# Patient Record
Sex: FEMALE | Race: WHITE | Employment: OTHER | URBAN - METROPOLITAN AREA
[De-identification: names, ages, dates, MRNs, and addresses within clinical notes are randomized per-mention and may not be internally consistent; named-entity substitution may affect disease eponyms.]

---

## 2021-03-10 ENCOUNTER — HOSPITAL ENCOUNTER (OUTPATIENT)
Dept: PREADMISSION TESTING | Age: 81
Discharge: HOME | End: 2021-03-10
Payer: MEDICARE

## 2021-03-10 PROCEDURE — U0003 INFECTIOUS AGENT DETECTION BY NUCLEIC ACID (DNA OR RNA); SEVERE ACUTE RESPIRATORY SYNDROME CORONAVIRUS 2 (SARS-COV-2) (CORONAVIRUS DISEASE [COVID-19]), AMPLIFIED PROBE TECHNIQUE, MAKING USE OF HIGH THROUGHPUT TECHNOLOGIES AS DESCRIBED BY CMS-2020-01-R: HCPCS

## 2021-03-11 LAB — SARS-COV-2, NAA: NOT DETECTED

## 2021-03-11 RX ORDER — DEXTROMETHORPHAN/PSEUDOEPHED 2.5-7.5/.8
1.2 DROPS ORAL
Status: CANCELLED | OUTPATIENT
Start: 2021-03-11

## 2021-03-11 RX ORDER — SODIUM CHLORIDE 0.9 % (FLUSH) 0.9 %
5-40 SYRINGE (ML) INJECTION AS NEEDED
Status: CANCELLED | OUTPATIENT
Start: 2021-03-11

## 2021-03-11 RX ORDER — EPINEPHRINE 0.1 MG/ML
1 INJECTION INTRACARDIAC; INTRAVENOUS
Status: CANCELLED | OUTPATIENT
Start: 2021-03-11 | End: 2021-03-12

## 2021-03-11 RX ORDER — ATROPINE SULFATE 0.1 MG/ML
0.5 INJECTION INTRAVENOUS
Status: CANCELLED | OUTPATIENT
Start: 2021-03-11 | End: 2021-03-12

## 2021-03-11 RX ORDER — DIPHENHYDRAMINE HYDROCHLORIDE 50 MG/ML
50 INJECTION, SOLUTION INTRAMUSCULAR; INTRAVENOUS ONCE
Status: CANCELLED | OUTPATIENT
Start: 2021-03-11 | End: 2021-03-11

## 2021-03-11 RX ORDER — SODIUM CHLORIDE 0.9 % (FLUSH) 0.9 %
5-40 SYRINGE (ML) INJECTION EVERY 8 HOURS
Status: CANCELLED | OUTPATIENT
Start: 2021-03-11

## 2021-03-15 ENCOUNTER — HOSPITAL ENCOUNTER (OUTPATIENT)
Age: 81
Setting detail: OUTPATIENT SURGERY
Discharge: HOME | End: 2021-03-15
Attending: INTERNAL MEDICINE | Admitting: INTERNAL MEDICINE
Payer: MEDICARE

## 2021-03-15 VITALS
HEART RATE: 65 BPM | SYSTOLIC BLOOD PRESSURE: 132 MMHG | DIASTOLIC BLOOD PRESSURE: 71 MMHG | HEIGHT: 63 IN | WEIGHT: 155 LBS | OXYGEN SATURATION: 98 % | BODY MASS INDEX: 27.46 KG/M2 | TEMPERATURE: 97.3 F | RESPIRATION RATE: 18 BRPM

## 2021-03-15 LAB — GLUCOSE BLD STRIP.AUTO-MCNC: 140 MG/DL (ref 70–110)

## 2021-03-15 PROCEDURE — 76040000007: Performed by: INTERNAL MEDICINE

## 2021-03-15 PROCEDURE — 82962 GLUCOSE BLOOD TEST: CPT

## 2021-03-15 PROCEDURE — 88305 TISSUE EXAM BY PATHOLOGIST: CPT

## 2021-03-15 PROCEDURE — 77030039961 HC KT CUST COLON BSC -D: Performed by: INTERNAL MEDICINE

## 2021-03-15 PROCEDURE — 77030013991 HC SNR POLYP ENDOSC BSC -A: Performed by: INTERNAL MEDICINE

## 2021-03-15 PROCEDURE — 77030040361 HC SLV COMPR DVT MDII -B: Performed by: INTERNAL MEDICINE

## 2021-03-15 PROCEDURE — G0500 MOD SEDAT ENDO SERVICE >5YRS: HCPCS | Performed by: INTERNAL MEDICINE

## 2021-03-15 PROCEDURE — 2709999900 HC NON-CHARGEABLE SUPPLY: Performed by: INTERNAL MEDICINE

## 2021-03-15 PROCEDURE — 74011250636 HC RX REV CODE- 250/636: Performed by: INTERNAL MEDICINE

## 2021-03-15 PROCEDURE — 99153 MOD SED SAME PHYS/QHP EA: CPT | Performed by: INTERNAL MEDICINE

## 2021-03-15 RX ORDER — NALOXONE HYDROCHLORIDE 0.4 MG/ML
0.4 INJECTION, SOLUTION INTRAMUSCULAR; INTRAVENOUS; SUBCUTANEOUS
Status: DISCONTINUED | OUTPATIENT
Start: 2021-03-15 | End: 2021-03-15 | Stop reason: HOSPADM

## 2021-03-15 RX ORDER — MIDAZOLAM HYDROCHLORIDE 1 MG/ML
.25-5 INJECTION, SOLUTION INTRAMUSCULAR; INTRAVENOUS
Status: DISCONTINUED | OUTPATIENT
Start: 2021-03-15 | End: 2021-03-15 | Stop reason: HOSPADM

## 2021-03-15 RX ORDER — LEVOTHYROXINE SODIUM 112 UG/1
112 TABLET ORAL
COMMUNITY

## 2021-03-15 RX ORDER — PRAVASTATIN SODIUM 40 MG/1
40 TABLET ORAL
COMMUNITY

## 2021-03-15 RX ORDER — SODIUM CHLORIDE 9 MG/ML
1000 INJECTION, SOLUTION INTRAVENOUS CONTINUOUS
Status: DISCONTINUED | OUTPATIENT
Start: 2021-03-15 | End: 2021-03-15 | Stop reason: HOSPADM

## 2021-03-15 RX ORDER — FENTANYL CITRATE 50 UG/ML
100 INJECTION, SOLUTION INTRAMUSCULAR; INTRAVENOUS
Status: DISCONTINUED | OUTPATIENT
Start: 2021-03-15 | End: 2021-03-15 | Stop reason: HOSPADM

## 2021-03-15 RX ORDER — METFORMIN HYDROCHLORIDE 500 MG/1
1000 TABLET ORAL DAILY
COMMUNITY

## 2021-03-15 RX ORDER — FLUMAZENIL 0.1 MG/ML
0.2 INJECTION INTRAVENOUS
Status: DISCONTINUED | OUTPATIENT
Start: 2021-03-15 | End: 2021-03-15 | Stop reason: HOSPADM

## 2021-03-15 RX ADMIN — SODIUM CHLORIDE 1000 ML: 900 INJECTION, SOLUTION INTRAVENOUS at 08:43

## 2021-03-15 NOTE — DISCHARGE INSTRUCTIONS
Yadira Phillips  [id number]  1940    COLON DISCHARGE INSTRUCTIONS    Discomfort:  Redness at IV site- apply warm compress to area; if redness or soreness persist- contact your physician  There may be a slight amount of blood passed from the rectum  Gaseous discomfort- walking, belching will help relieve any discomfort  You may not operate a vehicle til the next day. You may not engage in an occupation involving machinery or appliances til the next day. You may not drink alcoholic beverages til the next day. DIET:   High fiber diet. ACTIVITY:  You may not  resume your normal daily activities til the next day. it is recommended that you spend the remainder of the day resting -  avoid any strenuous activity. CALL M.D.  IF ANY SIGN OF:   Increasing pain, nausea, vomiting  Abdominal distension (swelling)  New increased bleeding (oral or rectal)  Fever (chills)  Pain in chest area  Bloody discharge from nose or mouth  Shortness of breath    You may not  take any Advil, Aspirin, Ibuprofen, Motrin, Aleve, or Goodys  ONLY  Tylenol as needed for pain. Post procedure diagnosis:  POLYP, DIVERTICULUM IN ASCENDING COLON, TORTUOUS COLON, BURNED OUT COLITIS    Follow-up Instructions: Your follow up colonoscopy will be in 5 years. We will notify you the results of your biopsy by letter within 2 weeks. Nita Ahn MD  March 15, 2021       DISCHARGE SUMMARY from Nurse    PATIENT INSTRUCTIONS:    After general anesthesia or intravenous sedation, for 24 hours or while taking prescription Narcotics:  · Limit your activities  · Do not drive and operate hazardous machinery  · Do not make important personal or business decisions  · Do  not drink alcoholic beverages  · If you have not urinated within 8 hours after discharge, please contact your surgeon on call.     Report the following to your surgeon:  · Excessive pain, swelling, redness or odor of or around the surgical area  · Temperature over 100.5  · Nausea and vomiting lasting longer than 4 hours or if unable to take medications  · Any signs of decreased circulation or nerve impairment to extremity: change in color, persistent  numbness, tingling, coldness or increase pain  · Any questions    What to do at Home:  Recommended activity: as above,         *  Please give a list of your current medications to your Primary Care Provider. *  Please update this list whenever your medications are discontinued, doses are      changed, or new medications (including over-the-counter products) are added. *  Please carry medication information at all times in case of emergency situations. These are general instructions for a healthy lifestyle:    No smoking/ No tobacco products/ Avoid exposure to second hand smoke  Surgeon General's Warning:  Quitting smoking now greatly reduces serious risk to your health. Obesity, smoking, and sedentary lifestyle greatly increases your risk for illness    A healthy diet, regular physical exercise & weight monitoring are important for maintaining a healthy lifestyle    You may be retaining fluid if you have a history of heart failure or if you experience any of the following symptoms:  Weight gain of 3 pounds or more overnight or 5 pounds in a week, increased swelling in our hands or feet or shortness of breath while lying flat in bed. Please call your doctor as soon as you notice any of these symptoms; do not wait until your next office visit. The discharge information has been reviewed with the patient and caregiver. The patient and caregiver verbalized understanding. Discharge medications reviewed with the patient and caregiver and appropriate educational materials and side effects teaching were provided.   ___________________________________________________________________________________________________________________________________  Patient armband removed and shredded

## 2021-03-15 NOTE — PROCEDURES
[facility]  Colonoscopy Procedure Report  _______________________________________________________  Patient: Carolynn Phan                                        Attending Physician: Andrew High MD    Patient ID: [id number]                                    Referring Physician: Henrique Santiago MD    Exam Date: 3/15/2021      Introduction: A  79 y.o. female patient, presents for inpatient Colonoscopy    Indications: 79 yr. old female pt referred by Dr. Madai Cooper for severe pan Ulcerative pancolitis since 1970. She has been under remission with the past 6 years since 2014 after being introduced to Azathioprin 150 mg once a day. She is also taking mesalamine 800 mg t.i.d.. Presently she has only 1 bowel movement every day there is no rectal bleeding or abdominal pain she has no diarrhea. .  According to her last Colonoscopy was 3/3/2016. Mild tortuosity of the sigmoid and splenic flexure. 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 Multiple discrete large plagues of varying sizes of hyperplasia are found in the rectum along with some internal hemorrhoids. Random biopsies taken showed diffusely quite sent colitis but no active disease no dysplasia. This patient has diabetes mellitus. The ulcerative colitis appears to be in remission the past 6 years. she is not interested in reducing the dose. CBC and CMP were completely normal in September 2020 which is very reassuring she had normal kidney function. Consent: The benefits, risks, and alternatives to the procedure were discussed and informed consent was obtained from the patient. Preparation: EKG, pulse, pulse oximetry and blood pressure were monitored throughout the procedure. ASA Classification: Class II- . The heart is an S1-S2 and regular heart rate and rhythm. Lungs are clear to auscultation and percussion. Abdomen is soft, nondistended, and nontender.  Mental Status: awake, alert, and oriented to person, place, and time    Medications:  · Fentanyl 100 mcg IV before procedure. · Versed 5 mg IV throughout the procedure. Rectal Exam: Decrease in anal sphincter tone. No Blood. Pathology Specimens:  3    Procedure: The pediatric colonoscope was passed with difficulty through the anus under direct visualization and advanced to the cecum. The patient required positioning on the back and external counter pressure to aid in the passage of the scope. The scope was withdrawn and the mucosa was carefully examined. The quality of the preparation was excellent. The views were excellent. The patient's toleration of the procedure was excellent. The exam was done once to the cecum. Total time is 29 minutes and withdrawal time is 14 minutes. Findings:    Rectum:   Normal  Sigmoid:   Tortuous and loopy sigmoid colon and splenic flexure. Descending Colon:   Normal   Transverse Colon:   7 mm semipedunculated polyp in the proximal transverse colon, hot snared. Ascending Colon:   One diverticulum in the distal ascending colon  Cecum:   Normal   Terminal Ileum:   Not entered      Unplanned Events: There were no unplanned events. Estimated Blood Loss: None  Impressions: Decrease in anal sphincter tone. Tortuous and loopy sigmoid colon and splenic flexure. 7 mm semipedunculated polyp in the proximal transverse colon, hot snared. Quiescent Colitis random biopsies taken throughout the colon. One diverticulum in the distal ascending colon. No visible colitis blood or AVM found. Complications: None; patient tolerated the procedure well. Recommendations:  · Discharge home when standard parameters are met. · Resume a high fiber diet. · Resume own medications but reduce the Asacol to 800 mg twice a day and Azathioprine to 100 mg daily. Avoid all NSAID's. · Colonoscopy recommendation in 5 years. · Need FU with Calprotectin, CBC and CMP every 6 months.     Procedure Codes:    · Rose Serrano [[id number]]  · Pramod Layne [[id number]]  Endoscope Information:  Model Number(s)    [id number]   Assistant: None  Signed By: Kristopher Cortes MD Date: 3/15/2021

## 2021-03-15 NOTE — H&P
Assessment/Plan  # Detail Type Description    1. Assessment Encounter for screening for malignant neoplasm of colon (Z12.11). Patient Plan 79 yr. old female pt referred by Dr. Carmen Ireland for severe melendez Ulcerative pancolitis since 1970. She has been under remission with the past 6 years since 2014 after being introduced to Azathioprin 150 mg once a day. She is also taking mesalamine 800 mg t.i.d.. Presently she has only 1 bowel movement every day there is no rectal bleeding or abdominal pain she has no diarrhea. .  According to her last Colonoscopy was 3/3/2016. Mild tortuosity of the sigmoid and splenic flexure. 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 Multiple discrete large plagues of varying sizes of hyperplasia are found in the rectum along with some internal hemorrhoids. Random biopsies taken showed diffusely quite sent colitis but no active disease no dysplasia. This patient has diabetes mellitus. The ulcerative colitis appears to be in remission the past 6 years. In theory it will be safer to repeat her colonoscopy this coming spring the patient agreed to this. I am going to send her for a few blood tests mostly to check for the metabolite of the Azathioprine. As she is not interested in reducing the dose. CBC and CMP were completely normal in September 2020 which is very reassuring she had normal kidney function. She appeared to be in excellent condition otherwise and much younger than her really age. She does exercise on a regular basis. 2. Other Orders Orders not associated to today's assessments. Plan Orders C-Reactive Protein, Quant to be performed. , Calprotectin, Fecal to be performed. and Thiopurine Metabolites to be performed. This 79year old  patient was referred by Hair Dorman. This 79year old female presents for Colon Cancer Screening. History of Present Illness:  1. Colon Cancer Screening   Prior screening:  colonoscopy.   Risk Factors:  Quiescent Colitis. Pertinent negatives include abdominal pain, change in bowel habits, change in stool caliber, constipation, decreased appetite, diarrhea, melena, nausea, rectal bleeding, vomiting, weight gain and weight loss. Additional information: Bm 1 x daily. Last Colonoscopy   3/3/2016. 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     Chronic ulcerative ileocolitis 2018 N     Diabetes mellitus without complication  N     Noninfectious gastroenteritis 2011 Y     Ulcerative colitis 2011 Y     Tobacco dependence syndrome 2011 Y     Hypothyroidism 2011 Y     Osteoporosis 2011 Y     Benign essential hypertension 2011 Y     Left bundle branch block 2011 Y     Menopausal symptom 2011 Y     Hyperlipidemia 2011 Y     Blood chemistry abnormal 2011 Y     Palpitations 2011 Y     Psoriasis 2011 Y     Mechanical complication of internal orthopedic device, implant AND/OR graft 2015 N               PAST MEDICAL/SURGICAL HISTORY   (Detailed)    Disease/disorder Onset Date Management Date Comments   BACK SURGERY 2002         removal of both tubes and ovaries 2017 EML 2017 -     Knee replacement 2014      Joint Replacement on toe 2014    DJD Knee  arthroscopy 2004    TONSILLECTOMY AND ADDENOIDS REMOVED       THYROID RESECTION       Bilateral tubal ligation       Cataracts  cataract extraction and lens implantation 2012    Hemorrhoid SURGERY       SHOULDER SURGERY         GYNECOLOGIC HISTORY:  Patient is postmenopausal.   OBSTETRIC HISTORY:  Not currently pregnant.        Family History  (Detailed)  Relationship Family Member Name  Age at Death Condition Onset Age Cause of Death   Father  Y  Coronary artery disease  Y   Father  Y    N   Maternal aunt    Cancer, breast  N   Mother  Y 80   N   Mother  Y  Blood disease  Y   Mother    Diabetes mellitus  N     Family History Comments  Relationship Family Member Name Condition Comments   Mother  Blood disease Wasn't Making Bone marrow       Social History:  (Detailed)  Tobacco use reviewed. Preferred language is Z[place]. The patient does not need an . MARITAL STATUS/FAMILY/SOCIAL SUPPORT  Marital status:    CHILDREN  Does not have children. Tobacco use status: Ex-cigarette smoker. Smoking status: Former smoker. TOBACCO SCREENING:  Patient has used tobacco.     SMOKING STATUS  Type Smoking Status Usage Per Day Years Used Pack Years Total Pack Years   Cigarette Former smoker         TOBACCO/VAPING EXPOSURE  No passive smoke exposure. ALCOHOL  There is a history of alcohol use. Type: Wine. consumed rarely. CAFFEINE  The patient uses caffeine: coffee - 2 cups a day. LIFESTYLE  Moderate activity level.               Medications (active prior to today)  Medication Name Sig Description Start Date Stop Date Refilled Rx Elsewhere   Restasis 0.05 % Eye Dropperette instill 1 drop by ophthalmic route  every 12 hours into affected eye(s) 02/29/2012   [facility] 1.5 [facility] cell capsule  01/06/2014   N   lancets test QID 08/05/2014   N   fluocinolone 0.01 % topical body oil apply by topical route 3 times every day to the affected area(s) 08/14/2014   N   Systane 0.4 %-0.3 % eye drops  08/14/2014   N   aspirin 81 mg chewable tablet chew 1 tablet by oral route  every day 10/27/2014   N   Prolia 60 mg/mL subcutaneous syringe inject 1 milliliter by subcutaneous route  every 6 months in the upper arm, upper thigh or abdomen 01/18/2016   N   OneTouch Ultra2 kit test ONCE DAILY 08/10/2017  08/10/2017 N   Vitamin D3 2,000 unit tablet take 1 tablet by mouth once daily 82/29/7607   N   folic acid 399 mcg tablet take 1 tablet by oral route  every day 08/02/2018   Y   desoximetasone 0.05 % topical cream apply by topical route 2 times every day a thin layer to the affected area(s) ; rub in gently and completely 08/30/2018   N   AZATHIOPRINE TABS 50MG TAKE 3 TABLETS DAILY 01/22/2020 01/22/2020 N   levothyroxine 112 mcg tablet take 1 tablet by oral route  every day 03/10/2020  03/10/2020 N   OneTouch Ultra Blue Test Strip take 1 by Subcutaneous route  every day 03/17/2020  51/64/1365 N   OneTouch Delica Lancets 33 gauge CHECK BLOOD SUGAR ONCE DAILY 03/17/2020 03/17/2020 N   atenolol 25 mg tablet take 1.5 tablet (25MG)  by oral route  every day 03/27/2020 03/27/2020 N   pravastatin 40 mg tablet take 1 tablet by oral route  every day 03/31/2020 03/31/2020 N   omeprazole 20 mg capsule,delayed release take 1 capsule by oral route  every day before a meal 04/21/2020 04/21/2020 N   mesalamine 800 mg tablet,delayed release take 1 tablet by oral route 3 times every day for 6 weeks 08/03/2020 08/03/2020 N   fluticasone propionate 50 mcg/actuation nasal spray,suspension spray 2 spray by intranasal route  every day in each nostril 09/16/2020   N   Glucophage  mg tablet,extended release take 2 tablet by oral route  every day with the evening meal 11/18/2020 11/18/2020 N   Medrol (Ger) 4 mg tablets in a dose pack take as directed by Oral route 11/30/2020 01/31/2021 11/30/2020 N   metformin  mg tablet,extended release 24hr take 2 tablet by oral route  every day with the evening meal //   Y     Medication Reconciliation  Medications reconciled today.   Medication Reviewed  Adherence Medication Name Sig Desc Elsewhere Status   taking as directed Restasis 0.05 % Eye Dropperette instill 1 drop by ophthalmic route  every 12 hours into affected eye(s) N Verified   taking as directed Carroll Narvaez [id number] 1.5 billion cell capsule  N Verified   taking as directed lancets test QID N Verified   taking as directed fluocinolone 0.01 % topical body oil apply by topical route 3 times every day to the affected area(s) N Verified   taking as directed Systane 0.4 %-0.3 % eye drops  N Verified   taking as directed aspirin 81 mg chewable tablet chew 1 tablet by oral route  every day N Verified   taking as directed Prolia 60 mg/mL subcutaneous syringe inject 1 milliliter by subcutaneous route  every 6 months in the upper arm, upper thigh or abdomen N Verified   taking as directed OneTouch Ultra2 kit test ONCE DAILY N Verified   taking as directed Vitamin D3 2,000 unit tablet take 1 tablet by mouth once daily N Verified   taking as directed folic acid 673 mcg tablet take 1 tablet by oral route  every day Y Verified   taking as directed desoximetasone 0.05 % topical cream apply by topical route 2 times every day a thin layer to the affected area(s) ; rub in gently and completely N Verified   taking as directed AZATHIOPRINE TABS 50MG TAKE 3 TABLETS DAILY N Verified   taking as directed levothyroxine 112 mcg tablet take 1 tablet by oral route  every day N Verified   taking as directed OneTouch Ultra Blue Test Strip take 1 by Subcutaneous route  every day N Verified   taking as directed OneTouch Delica Lancets 33 gauge CHECK BLOOD SUGAR ONCE DAILY N Verified   taking as directed atenolol 25 mg tablet take 1.5 tablet (25MG)  by oral route  every day N Verified   taking as directed pravastatin 40 mg tablet take 1 tablet by oral route  every day N Verified   taking as directed omeprazole 20 mg capsule,delayed release take 1 capsule by oral route  every day before a meal N Verified   taking as directed mesalamine 800 mg tablet,delayed release take 1 tablet by oral route 3 times every day for 6 weeks N Verified   taking as directed fluticasone propionate 50 mcg/actuation nasal spray,suspension spray 2 spray by intranasal route  every day in each nostril N Verified   taking as directed Glucophage  mg tablet,extended release take 2 tablet by oral route  every day with the evening meal N Verified   taking as directed Medrol (Ger) 4 mg tablets in a dose pack take as directed by Oral route N Verified   taking as directed metformin  mg tablet,extended release 24hr take 2 tablet by oral route  every day with the evening meal Y Verified     Medications (Added, Continued or Stopped today)  Start Date Medication Directions PRN Status PRN Reason Instruction Stop Date   10/27/2014 aspirin 81 mg chewable tablet chew 1 tablet by oral route  every day N      03/27/2020 atenolol 25 mg tablet take 1.5 tablet (25MG)  by oral route  every day N      01/22/2020 AZATHIOPRINE TABS 50MG TAKE 3 TABLETS DAILY N      08/30/2018 desoximetasone 0.05 % topical cream apply by topical route 2 times every day a thin layer to the affected area(s) ; rub in gently and completely N      08/14/2014 fluocinolone 0.01 % topical body oil apply by topical route 3 times every day to the affected area(s) N      09/16/2020 fluticasone propionate 50 mcg/actuation nasal spray,suspension spray 2 spray by intranasal route  every day in each nostril N      19/04/1129 folic acid 045 mcg tablet take 1 tablet by oral route  every day N      12/10/2020 Gavilyte-C 240 gram-22.72 gram-6.72 gram-5.84 gram oral solution take as prescribed by physician N      11/18/2020 Glucophage  mg tablet,extended release take 2 tablet by oral route  every day with the evening meal N      08/05/2014 lancets test QID N      03/10/2020 levothyroxine 112 mcg tablet take 1 tablet by oral route  every day N      11/30/2020 Medrol (Ger) 4 mg tablets in a dose pack take as directed by Oral route N   01/31/2021 08/03/2020 mesalamine 800 mg tablet,delayed release take 1 tablet by oral route 3 times every day for 6 weeks N       metformin  mg tablet,extended release 24hr take 2 tablet by oral route  every day with the evening meal N      04/21/2020 omeprazole 20 mg capsule,delayed release take 1 capsule by oral route  every day before a meal N 30/97/5526 OneTouch Delica Lancets 33 gauge CHECK BLOOD SUGAR ONCE DAILY N      03/17/2020 OneTouch Ultra Blue Test Strip take 1 by Subcutaneous route  every day N  E11.9    08/10/2017 OneTouch Ultra2 kit test ONCE DAILY N      01/06/2014 Carroll Narvaez [facility] 1.5 billion cell capsule  N      03/31/2020 pravastatin 40 mg tablet take 1 tablet by oral route  every day N      01/18/2016 Prolia 60 mg/mL subcutaneous syringe inject 1 milliliter by subcutaneous route  every 6 months in the upper arm, upper thigh or abdomen N      02/29/2012 Restasis 0.05 % Eye Dropperette instill 1 drop by ophthalmic route  every 12 hours into affected eye(s) N      08/14/2014 Systane 0.4 %-0.3 % eye drops  N      10/02/2017 Vitamin D3 2,000 unit tablet take 1 tablet by mouth once daily N        Allergies:  Ingredient Reaction (Severity) Medication Name Comment   ACE INHIBITORS unknown     AMOXICILLIN GI Problems     BALSALAZIDE diarrhea     CEPHALEXIN Constipation     CHLORHEXIDINE GLUCONATE Rash; Itching     DICYCLOMINE Diarrhea     LIDOCAINE rash Lidoderm    SULFANILAMIDE Unknown     TETANUS AND DIPHTHERIA TOXOIDS, ADSORBED, ADULT unknown     TETRACYCLINE Unknown;VAGINAL YEAST INFECTION     TOBRAMYCIN unknown     Reviewed, no changes. ORDERS:  Status Lab Order Time Frame Comments   ordered C-Reactive Protein, Quant     ordered Thiopurine Metabolites     ordered Calprotectin, Fecal       Review of System  System Neg/Pos Details   Constitutional Negative Fever, Weight gain and Weight loss. ENMT Negative Sinus Infection. Eyes Negative Double vision. Respiratory Negative Asthma, Chronic cough and Dyspnea. Cardio Negative Chest pain, Edema and Irregular heartbeat/palpitations. GI Negative Abdominal pain, Change in bowel habits, Change in stool caliber, Constipation, Decreased appetite, Diarrhea, Dysphagia, Heartburn, Hematemesis, Hematochezia, Melena, Nausea, Rectal bleeding, Reflux and Vomiting.     Negative Dysuria and Hematuria. Endocrine Negative Cold intolerance and Heat intolerance. Neuro Negative Dizziness, Headache, Numbness and Tremors. Psych Negative Anxiety, Depression and Increased stress. Integumentary Negative Hives, Pruritus and Rash. MS Negative Back pain, Joint pain and Myalgia. Hema/Lymph Negative Easy bleeding, Easy bruising and Lymphadenopathy. Allergic/Immuno Negative Food allergies and Immunosuppression. Reproductive Positive The patient is post-menopausal.     Vital Signs   Gynecologic History  Patient is postmenopausal.    Height  Time ft in cm Last Measured Height Position   11:24 AM 5.0 3.50 161.29 09/16/2020 Standing     MAP (Calculated) Arterial Line 1 BP (mmHg) BP Patient Position Resp SpO2 O2 Device O2 Flow Rate (L/min) Pre/Post Ductal Weight       03/15/21 0812 97.8 °F (36.6 °C) 73 133/56Abnormal  82   16 96 % Room air   70.3 kg (155 lb)       PHYSICAL EXAM:  Exam Findings Details   Constitutional Normal No acute distress. Well Nourished. Well developed. Eyes Normal General - Right: Normal, Left: Normal. Conjunctiva - Right: Normal, Left: Normal. Sclera - Right: Normal, Left: Normal. Cornea - Right: Normal, Left: Normal. Pupil - Right: Normal, Left: Normal.   Nose/Mouth/Throat Normal Lips/teeth/gums - Normal. Tongue - Normal. Buccal mucosa - Normal. Palate & uvula - Normal.   Neck Exam Normal Inspection - Normal. Palpation - Normal. Thyroid gland - Normal. Cervical lymph nodes - Normal.   Respiratory Normal Inspection - Normal. Auscultation - Normal. Percussion - Normal. Cough - Absent. Effort - Normal.   Cardiovascular Normal Heart rate - Regular rate. Heart sounds - Normal S1, Normal S2. Murmurs - None. Extremities - No edema. Abdomen Normal Inspection - Normal. Appliance(s) - None. Abdominal muscles - Normal. Auscultation - Normal. Percussion - Normal. Anterior palpation - Normal, No guarding, No rebound. CVA tenderness - None.  Umbilicus - Normal. Abdominal reflexes - Normal. No abdominal tenderness. No hepatic enlargement. No splenic enlargement. No hernia. No Ascites. No palpable mass. Barrera's sign - Negative. Skin Normal Inspection - Normal.   Musculoskeletal Normal Hands - Left: Normal, Right: Normal.   Extremity Normal No cyanosis. No edema. Clubbing - Absent. Neurological Normal Level of consciousness - Normal. Orientation - Normal. Memory - Normal. Motor - Normal. Balance & gait - Normal. Coordination - Normal. Fine motor skills - Normal. DTRs - Normal.   Psychiatric Normal Orientation - Oriented to time, place, person & situation. Not anxious. Appropriate mood and affect. Behavior appropriate for age. Sufficient language. No memory loss.        Immunizations Entered by History    Date Immunization   10/16/2018 12:00:00 AM Flu (3 yrs or older)   10/6/2018 12:00:00 AM Shingrix   8/7/2018 12:00:00 AM Shingrix   10/17/2017 12:00:00 AM Flu (3 yrs or older)   10/13/2016 12:00:00 AM influenza, high dose seasonal, preservative-free   10/12/2015 12:00:00 AM influenza, high dose seasonal, preservative-free   10/27/2014 12:00:00 AM influenza, high dose seasonal, preservative-free   10/15/2013 12:00:00 AM flu (split) (3 yrs or older)   9/2/2009 12:00:00 AM Zoster   10/12/2012 12:00:00 AM flu (split) (3 yrs or older)   7/25/2012 12:00:00 AM Td (7 yrs or older)   10/18/2010 12:00:00 AM pneumo (2 yrs or older) (PPV23)   10/2/2001 12:00:00 AM pneumo (2 yrs or older) (PPV23)   10/28/2003 12:00:00 AM flu (split) (3 yrs or older)   10/23/2001 12:00:00 AM flu (split) (3 yrs or older)     Active Patient Care Team Members    Name Contact Agency Type Support Role Relationship Active Date Inactive Date Specialty   Clara Kiss   encounter provider       Theresa Dhaliwal   encounter provider    Urology   Felisa Shanks   Emergency Contact Other      Mariam Nichols   Patient provider PCP   Family Practice   Mariam Nichols   primary care provider       Gladys Cover   encounter provider Gastroenterology   Arron How   encounter provider    Rheumatology   Mont Gilford   encounter provider    Gastroenterology

## 2021-07-03 ENCOUNTER — HOSPITAL ENCOUNTER (EMERGENCY)
Age: 81
Discharge: HOME | End: 2021-07-03
Attending: EMERGENCY MEDICINE
Payer: MEDICARE

## 2021-07-03 VITALS
HEART RATE: 68 BPM | WEIGHT: 152 LBS | OXYGEN SATURATION: 96 % | TEMPERATURE: 98.5 F | HEIGHT: 63 IN | RESPIRATION RATE: 16 BRPM | BODY MASS INDEX: 26.93 KG/M2 | SYSTOLIC BLOOD PRESSURE: 134 MMHG | DIASTOLIC BLOOD PRESSURE: 57 MMHG

## 2021-07-03 DIAGNOSIS — J06.9 VIRAL UPPER RESPIRATORY TRACT INFECTION: Primary | ICD-10-CM

## 2021-07-03 PROCEDURE — 99282 EMERGENCY DEPT VISIT SF MDM: CPT

## 2021-07-03 NOTE — ED PROVIDER NOTES
EMERGENCY DEPARTMENT HISTORY AND PHYSICAL EXAM    Date: 7/3/2021  Patient Name: Bay Mcneill    History of Presenting Illness     Chief Complaint   Patient presents with    Cough       History Provided By: Patient     History Lash[place]):   2:16 PM  Bay Mcneill is a 79 y.o. female with PMHX of Psoriasis, arthritis is, left bundle branch block, hypertension, ulcerative colitis, hypothyroid who presents to the emergency department C/O cough onset 1 week ago. Associated sxs include scratchy throat. Pt denies fever, chills, nausea, vomiting or any other sxs or complaints. Patient states her cough is dry in nature. She said her cough for approximately 1 week. Her primary care doctor is out of town so that her partner called in a Z-Ger for her but had not seen her. Patient subsequently went to urgent care yesterday and they prescribed her with Brie Reynoso which did not help either. Patient states that her chest x-ray was done at urgent care yesterday and did not show any signs of pneumonia. Patient is concerned that the cough is still ongoing. Chief Complaint: Cough  Onset: 1 week  Timing: Acute  Context: Symptoms started spontaneously, symptoms have not changed since onset  Location: Lungs  Quality: Scratchy  Severity: Moderate  Modifying Factors: Nothing makes it better, or worse. Associated Symptoms: Scratchy throat    PCP: Jessica Gray MD     Current Outpatient Medications   Medication Sig Dispense Refill    doxylamine-dextromethorphan (Robitussin Nighttime Cough DM) 3.125-7.5 mg/5 mL liqd Take 5 mL by mouth four (4) times daily as needed for Cough. 120 mL 0    levothyroxine (SYNTHROID) 112 mcg tablet Take 112 mcg by mouth Daily (before breakfast).  pravastatin (PRAVACHOL) 40 mg tablet Take 40 mg by mouth nightly.  metFORMIN (GLUCOPHAGE) 500 mg tablet Take 1,000 mg by mouth daily.  denosumab (PROLIA) 60 mg/mL injection 60 mg by SubCUTAneous route every 6 months.  Due July 2016      cholecalciferol (VITAMIN D3) 1,000 unit cap Take 1,000 Units by mouth daily. Indications: VITAMIN D DEFICIENCY      azathioprine (IMURAN) 50 mg tablet Take 50 mg by mouth three (3) times daily (with meals).  atenolol (TENORMIN) 25 mg tablet Take 25 mg by mouth daily. Indications: HYPERTENSION      omeprazole (PRILOSEC) 20 mg capsule Take 20 mg by mouth Daily (before dinner).  fluocinolone-shower cap (DERMA-SMOOTHE/FS SCALP OIL) 0.01 % oil by Apply Externally route every seven (7) days. Indications: SCALP PSORIASIS      mesalamine DR (ASACOL HD) 800 mg DR tablet Take 800 mg by mouth three (3) times daily. Patient takes before breakfast, at 2 pm and at bedtime)      cycloSPORINE (RESTASIS) 0.05 % ophthalmic emulsion Administer 1 Drop to both eyes two (2) times a day.  tacrolimus (PROTOPIC) 0.1 % ointment Apply  to affected area as needed.  multivitamin (ONE A DAY) tablet Take 1 Tab by mouth daily.  folic acid 056 mcg tablet Take 400 mcg by mouth daily.  Lacto gasseri-B bifid-B longum (Zilta) 1.5 billion cell cap Take 1 Cap by mouth daily.          Past History         Past Medical History:  Past Medical History:   Diagnosis Date    Arrhythmia     palpitations    Arthritis     Autoimmune disease ([facility] [address]     Psoriasis    CAD (coronary artery disease)     Left bundle branch block    Diabetes (HCC)     GERD (gastroesophageal reflux disease)     well controlled    Hypertension     Osteoarthritis of left knee 11/17/2014    Other ill-defined conditions(799.89)     high cholestrol    Other ill-defined conditions(799.89)     psoriasis    Other ill-defined conditions(799.89)     ulcerative colitis    Thyroid disease     hypo       Past Surgical History:  Past Surgical History:   Procedure Laterality Date    COLONOSCOPY N/A 3/15/2021    COLONOSCOPY WITH POLYPECTOMY WITH HOT SNARE performed by Cherelle Solano MD at [facility] SURGERY      HX CATARACT REMOVAL Bilateral     HX GI      egd    HX GI      multiple colonoscopy    HX GI      hemorrhoidectomy    HX HEENT      partial thyroidectomy    HX KNEE ARTHROSCOPY Left     HX MOHS PROCEDURES Left     HX ORTHOPAEDIC Right     Second toe joint replacement    HX ORTHOPAEDIC      left knee replacement    HX TONSILLECTOMY      HX TUBAL LIGATION         Family History:  Family History   Problem Relation Age of Onset    Lung Disease Mother    Reviewed and non-contributory    Social History:  Social History     Tobacco Use    Smoking status: Former Smoker     Quit date: 2013     Years since quittin.2    Smokeless tobacco: Never Used   Substance Use Topics    Alcohol use: Yes     Comment: 3-4 glasses wine yearly    Drug use: No       Allergies: Allergies   Allergen Reactions    Amoxicillin Diarrhea    Cephalexin Other (comments)     Severe constipation    Chlorhexidine Rash    Dicyclomine Diarrhea    Lidoderm [Lidocaine] Rash     Red rash underneath patch    Other Medication Other (comments)     Per pt Anti Inflammatory meds make her sick    Sulfa (Sulfonamide Antibiotics) Other (comments)     Just don't feel good    Tetracycline Other (comments)     Yeast infections         Review of Systems      Review of Systems   Constitutional: Negative for chills and fever. HENT: Negative for congestion, rhinorrhea and sore throat. Eyes: Negative for pain and visual disturbance. Respiratory: Positive for cough. Negative for shortness of breath and wheezing. Cardiovascular: Negative for chest pain and palpitations. Gastrointestinal: Negative for abdominal pain, diarrhea and vomiting. Genitourinary: Negative for dysuria, flank pain, frequency and urgency. Musculoskeletal: Negative for arthralgias and myalgias. Skin: Negative for rash and wound. Neurological: Negative for speech difficulty, weakness, light-headedness and headaches.    Psychiatric/Behavioral: Negative for agitation and confusion. All other systems reviewed and are negative. Physical Exam     Vitals:    07/03/21 1410   BP: (!) 134/57   Pulse: 68   Resp: 16   Temp: 98.5 °F (36.9 °C)   SpO2: 96%   Weight: 68.9 kg (152 lb)   Height: 5' 3\" (1.6 m)       Physical Exam  Vitals and nursing note reviewed. Constitutional:       General: She is not in acute distress. Appearance: Normal appearance. She is normal weight. She is not ill-appearing. HENT:      Head: Normocephalic and atraumatic. Nose: Nose normal. No rhinorrhea. Mouth/Throat:      Mouth: Mucous membranes are moist.      Pharynx: No oropharyngeal exudate or posterior oropharyngeal erythema. Eyes:      Extraocular Movements: Extraocular movements intact. Conjunctiva/sclera: Conjunctivae normal.      Pupils: Pupils are equal, round, and reactive to light. Cardiovascular:      Rate and Rhythm: Normal rate and regular rhythm. Heart sounds: No murmur heard. No friction rub. No gallop. Pulmonary:      Effort: Pulmonary effort is normal. No respiratory distress. Breath sounds: Normal breath sounds. No wheezing, rhonchi or rales. Abdominal:      General: Bowel sounds are normal.      Palpations: Abdomen is soft. Tenderness: There is no abdominal tenderness. There is no guarding or rebound. Musculoskeletal:         General: No swelling, tenderness or deformity. Normal range of motion. Cervical back: Normal range of motion and neck supple. No rigidity. Lymphadenopathy:      Cervical: No cervical adenopathy. Skin:     General: Skin is warm and dry. Findings: No rash. Neurological:      General: No focal deficit present. Mental Status: She is alert and oriented to person, place, and time.    Psychiatric:         Mood and Affect: Mood normal.         Behavior: Behavior normal.         Diagnostic Study Results     Labs -   No results found for this or any previous visit (from the past 12 hour(s)). Radiologic Studies -   No orders to display     CT Results  (Last 48 hours)    None        CXR Results  (Last 48 hours)    None          Medications given in the ED-  Medications - No data to display      Medical Decision Making   I am the first provider for this patient. I reviewed the vital signs, available nursing notes, past medical history, past surgical history, family history and social history. Vital Signs-Reviewed the patient's vital signs. Records Reviewed: Nursing Notes    Pulse Oximetry Analysis - 96% on RA     Cardiac Monitor:  Rate: 68 bpm  Rhythm: sinus rhythm    Provider Notes (Medical Decision Making):   DDX: URI, unlikely pneumonia    Procedures:  Procedures    ED Course:   2:16 PM Initial assessment performed. The patients presenting problems have been discussed, and they are in agreement with the care plan formulated and outlined with them. I have encouraged them to ask questions as they arise throughout their visit. Diagnosis and Disposition     Discussion:  79 y.o. female who has a cough. She was treated over the telephone by her doctors partner with a Z-Ger. She has subsequently had a negative chest x-ray and no relief with Tessalon Perles. Discussed at length with patient that there are not a lot of things that will honestly help with cough. Informed her that based on her x-ray from yesterday, the antibiotics are likely not helping. Informed the patient that her cough can persist for up to 30 days after her other symptoms are improved. Ultimately we can try some Robitussin and encourage the patient to drink tea with honey. Patient may follow-up with her primary care doctor. Patient understands agrees with this plan. DISCHARGE NOTE:  2:36 PM   Robyn Bashir's  results have been reviewed with her. She has been counseled regarding her diagnosis, treatment, and plan.   She verbally conveys understanding and agreement of the signs, symptoms, diagnosis, treatment and prognosis and additionally agrees to follow up as discussed. She also agrees with the care-plan and conveys that all of her questions have been answered. I have also provided discharge instructions for her that include: educational information regarding their diagnosis and treatment, and list of reasons why they would want to return to the ED prior to their follow-up appointment, should her condition change. She has been provided with education for proper emergency department utilization. CLINICAL IMPRESSION:    1. Viral upper respiratory tract infection        PLAN:  1. D/C Home  2. Current Discharge Medication List      START taking these medications    Details   doxylamine-dextromethorphan (Robitussin Nighttime Cough DM) 3.125-7.5 mg/5 mL liqd Take 5 mL by mouth four (4) times daily as needed for Cough. Qty: 120 mL, Refills: 0  Start date: 7/3/2021           3. Follow-up Information     Follow up With Specialties Details Why Funmi Cope MD Family Medicine Schedule an appointment as soon as possible for a visit in 1 week For follow up from Emergency Department visit. [address] [id number] [phone]      [facility] EMERGENCY DEPT Emergency Medicine  As needed; If symptoms worsen [address] [id number]  [address]     Please note that this dictation was completed with [employer], the computer voice recognition software. Quite often unanticipated grammatical, syntax, homophones, and other interpretive errors are inadvertently transcribed by the computer software. Please disregard these errors. Please excuse any errors that have escaped final proofreading.     Etienne Chavis MD

## 2023-02-14 ENCOUNTER — TRANSCRIBE ORDERS (OUTPATIENT)
Facility: HOSPITAL | Age: 83
End: 2023-02-14

## 2023-02-14 ENCOUNTER — HOSPITAL ENCOUNTER (OUTPATIENT)
Facility: HOSPITAL | Age: 83
Discharge: HOME | End: 2023-02-16
Payer: MEDICARE

## 2023-02-14 DIAGNOSIS — M79.605 LEFT LEG PAIN: Primary | ICD-10-CM

## 2023-02-14 DIAGNOSIS — M79.605 LEFT LEG PAIN: ICD-10-CM

## 2023-02-14 PROCEDURE — 93971 EXTREMITY STUDY: CPT

## 2024-05-20 ENCOUNTER — HOSPITAL ENCOUNTER (OUTPATIENT)
Facility: HOSPITAL | Age: 84
Setting detail: OUTPATIENT SURGERY
Discharge: HOME | End: 2024-05-23
Payer: MEDICARE

## 2024-05-20 VITALS
WEIGHT: 161.3 LBS | RESPIRATION RATE: 16 BRPM | HEART RATE: 61 BPM | HEIGHT: 63 IN | TEMPERATURE: 98 F | OXYGEN SATURATION: 99 % | SYSTOLIC BLOOD PRESSURE: 137 MMHG | BODY MASS INDEX: 28.58 KG/M2 | DIASTOLIC BLOOD PRESSURE: 52 MMHG

## 2024-05-20 DIAGNOSIS — N20.0 CALCULUS OF KIDNEY: Primary | ICD-10-CM

## 2024-05-20 LAB — GLUCOSE BLD STRIP.AUTO-MCNC: 141 MG/DL (ref 70–110)

## 2024-05-20 PROCEDURE — 82962 GLUCOSE BLOOD TEST: CPT

## 2024-05-20 PROCEDURE — 50590 FRAGMENTING OF KIDNEY STONE: CPT

## 2024-05-20 PROCEDURE — 2709999900 HC NON-CHARGEABLE SUPPLY

## 2024-05-20 PROCEDURE — 6360000002 HC RX W HCPCS: Performed by: UROLOGY

## 2024-05-20 PROCEDURE — 99152 MOD SED SAME PHYS/QHP 5/>YRS: CPT

## 2024-05-20 PROCEDURE — 7100000010 HC PHASE II RECOVERY - FIRST 15 MIN

## 2024-05-20 PROCEDURE — 7100000011 HC PHASE II RECOVERY - ADDTL 15 MIN

## 2024-05-20 PROCEDURE — 99153 MOD SED SAME PHYS/QHP EA: CPT

## 2024-05-20 PROCEDURE — 2580000003 HC RX 258: Performed by: UROLOGY

## 2024-05-20 RX ORDER — TRAMADOL HYDROCHLORIDE 50 MG/1
50 TABLET ORAL EVERY 6 HOURS PRN
Qty: 12 TABLET | Refills: 0 | Status: SHIPPED | OUTPATIENT
Start: 2024-05-20 | End: 2024-05-23

## 2024-05-20 RX ORDER — ASPIRIN 81 MG/1
81 TABLET, CHEWABLE ORAL DAILY
COMMUNITY

## 2024-05-20 RX ORDER — FLUMAZENIL 0.1 MG/ML
0.5 INJECTION INTRAVENOUS ONCE
Status: DISCONTINUED | OUTPATIENT
Start: 2024-05-20 | End: 2024-05-24 | Stop reason: HOSPADM

## 2024-05-20 RX ORDER — FENTANYL CITRATE 50 UG/ML
300 INJECTION, SOLUTION INTRAMUSCULAR; INTRAVENOUS PRN
Status: DISCONTINUED | OUTPATIENT
Start: 2024-05-20 | End: 2024-05-24 | Stop reason: HOSPADM

## 2024-05-20 RX ORDER — ALFUZOSIN HYDROCHLORIDE 10 MG/1
10 TABLET, EXTENDED RELEASE ORAL DAILY
Qty: 30 TABLET | Refills: 0 | Status: SHIPPED | OUTPATIENT
Start: 2024-05-20

## 2024-05-20 RX ORDER — SODIUM CHLORIDE, SODIUM LACTATE, POTASSIUM CHLORIDE, CALCIUM CHLORIDE 600; 310; 30; 20 MG/100ML; MG/100ML; MG/100ML; MG/100ML
INJECTION, SOLUTION INTRAVENOUS CONTINUOUS
Status: DISCONTINUED | OUTPATIENT
Start: 2024-05-20 | End: 2024-05-24 | Stop reason: HOSPADM

## 2024-05-20 RX ORDER — NALOXONE HYDROCHLORIDE 0.4 MG/ML
1 INJECTION, SOLUTION INTRAMUSCULAR; INTRAVENOUS; SUBCUTANEOUS PRN
Status: DISCONTINUED | OUTPATIENT
Start: 2024-05-20 | End: 2024-05-24 | Stop reason: HOSPADM

## 2024-05-20 RX ORDER — OXYCODONE HYDROCHLORIDE AND ACETAMINOPHEN 5; 325 MG/1; MG/1
1 TABLET ORAL EVERY 4 HOURS PRN
OUTPATIENT
Start: 2024-05-20

## 2024-05-20 RX ORDER — SODIUM CHLORIDE, SODIUM LACTATE, POTASSIUM CHLORIDE, CALCIUM CHLORIDE 600; 310; 30; 20 MG/100ML; MG/100ML; MG/100ML; MG/100ML
INJECTION, SOLUTION INTRAVENOUS CONTINUOUS
OUTPATIENT
Start: 2024-05-20

## 2024-05-20 RX ORDER — MIDAZOLAM HYDROCHLORIDE 1 MG/ML
6 INJECTION INTRAMUSCULAR; INTRAVENOUS PRN
Status: DISCONTINUED | OUTPATIENT
Start: 2024-05-20 | End: 2024-05-24 | Stop reason: HOSPADM

## 2024-05-20 RX ADMIN — SODIUM CHLORIDE, POTASSIUM CHLORIDE, SODIUM LACTATE AND CALCIUM CHLORIDE: 600; 310; 30; 20 INJECTION, SOLUTION INTRAVENOUS at 11:42

## 2024-05-20 RX ADMIN — FENTANYL CITRATE 100 MCG: 50 INJECTION INTRAMUSCULAR; INTRAVENOUS at 13:19

## 2024-05-20 RX ADMIN — MIDAZOLAM HYDROCHLORIDE 2 MG: 1 INJECTION, SOLUTION INTRAMUSCULAR; INTRAVENOUS at 13:19

## 2024-05-20 ASSESSMENT — PAIN - FUNCTIONAL ASSESSMENT
PAIN_FUNCTIONAL_ASSESSMENT: NONE - DENIES PAIN
PAIN_FUNCTIONAL_ASSESSMENT: 0-10

## 2024-05-20 NOTE — PERIOP NOTE
Patient spoke with Shayy Edmondson a friend and has confirmed that she will be able to stay with her for first 24 hours.

## 2024-05-20 NOTE — PERIOP NOTE
EKG taped to EKG monitoring sheet    Staff:  MD Katiana Fernandez, RN  Diamond Ayala, RT    Procedure: Extracorporeal Shockwave Lithotripsy-Left Kidney     Treatment site: Left flank area-Pink

## 2024-05-20 NOTE — DISCHARGE INSTRUCTIONS
DISCHARGE SUMMARY from Nurse    PATIENT INSTRUCTIONS:    After general anesthesia or intravenous sedation, for 24 hours or while taking prescription Narcotics:  Limit your activities  Do not drive and operate hazardous machinery  Do not make important personal or business decisions  Do  not drink alcoholic beverages  If you have not urinated within 8 hours after discharge, please contact your surgeon on call.    Report the following to your surgeon:  Excessive pain, swelling, redness or odor of or around the surgical area  Temperature over 100.5  Nausea and vomiting lasting longer than 4 hours or if unable to take medications  Any signs of decreased circulation or nerve impairment to extremity: change in color, persistent  numbness, tingling, coldness or increase pain  Any questions    What to do at Home:  Recommended activity: activity as tolerated    If you experience any of the following symptoms FEVER, ACTIVE BLEEDING, NAUSEA/VOMITING, PAIN NOT RESOLVED BY PRESCRIBED PAIN MEDICATION, please follow up with DR. SHAFER.    *  Please give a list of your current medications to your Primary Care Provider.    *  Please update this list whenever your medications are discontinued, doses are      changed, or new medications (including over-the-counter products) are added.    *  Please carry medication information at all times in case of emergency situations.    These are general instructions for a healthy lifestyle:    No smoking/ No tobacco products/ Avoid exposure to second hand smoke  Surgeon General's Warning:  Quitting smoking now greatly reduces serious risk to your health.    Obesity, smoking, and sedentary lifestyle greatly increases your risk for illness    A healthy diet, regular physical exercise & weight monitoring are important for maintaining a healthy lifestyle    You may be retaining fluid if you have a history of heart failure or if you experience any of the following symptoms:  Weight gain of 3 pounds

## 2024-05-20 NOTE — PERIOP NOTE
Reviewed PTA medication list with patient/caregiver and patient/caregiver denies any additional medications.     Patient admits to having a responsible adult care for them at home for at least 24 hours after surgery.    Patient encouraged to use gown warming system and informed that using said warming gown to regulate body temperature prior to a procedure has been shown to help reduce the risks of blood clots and infection.    Patient's pharmacy of choice verified and documented in PTA medication section.    Dual skin assessment & fall risk band verification completed with Kelsey ALVA RN.

## 2024-05-20 NOTE — PERIOP NOTE
TRANSFER - IN REPORT:    Verbal report received from Katiana MONTES RN on Joya Kendrick  being received from OR for routine progression of patient care      Report consisted of patient's Situation, Background, Assessment and   Recommendations(SBAR).     Information from the following report(s) Nurse Handoff Report, Surgery Report, Intake/Output, and MAR was reviewed with the receiving nurse.    Opportunity for questions and clarification was provided.      Assessment completed upon patient's arrival to unit and care assumed.

## 2024-05-20 NOTE — OP NOTE
Operative Note      Patient: Joya Kendrick  YOB: 1940  MRN: [id number]    Date of Procedure: 5/20/2024     Preop Diagnosis:  Hydronephrosis with ureteral stone     Post-Op Diagnosis: Same       Procedure: Left ESWL Kidney     Surgeon: JOSE L SHAFER MD     Assistant:   * No surgical staff found *     Anesthesia: conscious sedation     Estimated Blood Loss (mL): Minimal     Complications: None     Specimens:   * No specimens in log *     Implants:  * No implants in log *      Drains: * No LDAs found *     Findings:  Infection Present At Time Of Surgery (PATOS) (choose all levels that have infection present):  No infection present        Detailed Description of Procedure:         The risks, benefits, complications, treatment options, and expected outcomes were discussed with the patient. The patient concurred with the proposed plan, giving informed consent. Time out was held prior to procedure.     The patient was placed in the supine position. The stone was aligned using fluoroscopic examination.      The patient was then given 3000 at a maximum kV of 7.  The patient tolerated the procedure well.      Findings:  There appeared to be mild changes in the stone.     Estimated Blood Loss:  none     Specimens: none        Implants: none            Condition: stable    Electronically signed by JOSE L SHAFER MD on 5/20/2024 at 1:59 PM

## 2024-05-20 NOTE — H&P
1 tablet by oral route  every day N      08/05/2014 lancets test QID N      02/14/2024 levothyroxine 112 mcg tablet TAKE 1 TABLET DAILY N      02/02/2024 losartan 50 mg tablet take 1 tablet by oral route  every day N      06/08/2023 magnesium 400 mg (as magnesium oxide) tablet 1 po bid N      02/02/2024 mesalamine 800 mg tablet,delayed release TAKE 1 TABLET BY MOUTH TWICE DAILY N      02/02/2024 metformin  mg tablet,extended release 24 hr take 3 tablet by ORAL route  every day with evening meal N      10/13/2023 mupirocin 2 % topical ointment apply by topical route 2 times every day a small amount to the affected area N      09/25/2023 omeprazole 20 mg capsule,delayed release take 1 capsule by oral route  every day before a meal N      08/23/2023 ONE TOUCH ULTRA BLUE TESTST(NEW)100 USE TO TEST AS DIRECTED DAILY N      10/20/2022 OneTouch Delica Lancets 33 gauge CHECK BLOOD SUGAR ONCE DAILY N      01/06/2014 [facility] Meliuz 1.5 billion cell capsule  N      12/19/2023 pravastatin 40 mg tablet take 1 tablet by oral route  every day N      01/18/2016 Prolia 60 mg/mL subcutaneous syringe inject 1 milliliter by subcutaneous route  every 6 months in the upper arm, upper thigh or abdomen N      02/29/2012 Restasis 0.05 % Eye Dropperette instill 1 drop by ophthalmic route  every 12 hours into affected eye(s) N      08/14/2014 Systane 0.4 %-0.3 % eye drops  N      10/06/2022 valacyclovir 1 gram tablet take 2 tablet by ORAL route  every 12 hours N      10/02/2017 Vitamin D3 2,000 unit tablet take 1 tablet by mouth once daily N            Provider      JOSE L SHAFER MD

## 2025-02-06 ENCOUNTER — TRANSCRIBE ORDERS (OUTPATIENT)
Facility: HOSPITAL | Age: 85
End: 2025-02-06

## 2025-02-06 DIAGNOSIS — M25.562 LEFT KNEE PAIN, UNSPECIFIED CHRONICITY: ICD-10-CM

## 2025-02-06 DIAGNOSIS — Z96.652 HISTORY OF TOTAL LEFT KNEE REPLACEMENT: Primary | ICD-10-CM

## 2025-02-13 ENCOUNTER — HOSPITAL ENCOUNTER (OUTPATIENT)
Facility: HOSPITAL | Age: 85
Discharge: HOME | End: 2025-02-16
Attending: ORTHOPAEDIC SURGERY
Payer: MEDICARE

## 2025-02-13 DIAGNOSIS — M25.562 LEFT KNEE PAIN, UNSPECIFIED CHRONICITY: ICD-10-CM

## 2025-02-13 DIAGNOSIS — Z96.652 HISTORY OF TOTAL LEFT KNEE REPLACEMENT: ICD-10-CM

## 2025-02-13 PROCEDURE — A9503 TC99M MEDRONATE: HCPCS | Performed by: ORTHOPAEDIC SURGERY

## 2025-02-13 PROCEDURE — 3430000000 HC RX DIAGNOSTIC RADIOPHARMACEUTICAL: Performed by: ORTHOPAEDIC SURGERY

## 2025-02-13 RX ORDER — TC 99M MEDRONATE 20 MG/10ML
29.3 INJECTION, POWDER, LYOPHILIZED, FOR SOLUTION INTRAVENOUS
Status: COMPLETED | OUTPATIENT
Start: 2025-02-13 | End: 2025-02-13

## 2025-02-13 RX ADMIN — TC 99M MEDRONATE 29.3 MILLICURIE: 20 INJECTION, POWDER, LYOPHILIZED, FOR SOLUTION INTRAVENOUS at 11:05

## (undated) DEVICE — Device

## (undated) DEVICE — TRAP SPEC COLL POLYP POLYSTYR --

## (undated) DEVICE — SYRINGE 50ML E/T

## (undated) DEVICE — CATH IV SAFE STR 22GX1IN BLU -- PROTECTIV PLUS

## (undated) DEVICE — SPONGE GZ W4XL4IN RAYON POLY 4 PLY NONWOVEN FASTER WICKING

## (undated) DEVICE — SINGLE PORT MANIFOLD: Brand: NEPTUNE 2

## (undated) DEVICE — SYR 3ML LL TIP 1/10ML GRAD --

## (undated) DEVICE — Z DISCONTINUED NO SUB IDED PLATE DISP SPL TYP ERBE NESSY P FOR PSD-60

## (undated) DEVICE — NDL FLTR TIP 5 MIC 18GX1.5IN --

## (undated) DEVICE — SNARE POLYP SM W13MMXL240CM SHTH DIA2.4MM OVL FLX DISP

## (undated) DEVICE — MAJ-1414 SINGLE USE ADPATER BIOPSY VALV: Brand: SINGLE USE ADAPTOR BIOPSY VALVE

## (undated) DEVICE — SYR 5ML 1/5 GRAD LL NSAF LF --

## (undated) DEVICE — GARMENT,MEDLINE,DVT,INT,CALF,MED, GEN2: Brand: MEDLINE

## (undated) DEVICE — CANNULA CUSH AD W/ 14FT TBG

## (undated) DEVICE — TUBING, SUCTION, 1/4" X 12', STRAIGHT: Brand: MEDLINE

## (undated) DEVICE — CATH SUC CTRL PRT TRIFLO 14FR --

## (undated) DEVICE — TRNQT TEXT 1X18IN BLU LF DISP -- CONVERT TO ITEM 362165

## (undated) DEVICE — NDL PRT INJ NSAF BLNT 18GX1.5 --

## (undated) DEVICE — SOLUTION IV 500ML 0.9% SOD CHL FLX CONT

## (undated) DEVICE — SET ADMIN 16ML TBNG L100IN 2 Y INJ SITE IV PIGGY BK DISP

## (undated) DEVICE — WRISTBAND ID AD W2.5XL9.5CM RED VYN ADH CLSR UNI-PRINT

## (undated) DEVICE — KENDALL RADIOLUCENT FOAM MONITORING ELECTRODE RECTANGULAR SHAPE: Brand: KENDALL

## (undated) DEVICE — SPONGE GZ W4XL4IN COT 12 PLY TYP VII WVN C FLD DSGN